# Patient Record
Sex: MALE | Race: WHITE | Employment: FULL TIME | URBAN - METROPOLITAN AREA
[De-identification: names, ages, dates, MRNs, and addresses within clinical notes are randomized per-mention and may not be internally consistent; named-entity substitution may affect disease eponyms.]

---

## 2017-01-05 PROBLEM — G56.02 CARPAL TUNNEL SYNDROME ON LEFT: Status: ACTIVE | Noted: 2017-01-05

## 2017-01-30 ENCOUNTER — OFFICE VISIT (OUTPATIENT)
Dept: FAMILY MEDICINE CLINIC | Facility: CLINIC | Age: 57
End: 2017-01-30

## 2017-01-30 VITALS
WEIGHT: 267 LBS | RESPIRATION RATE: 16 BRPM | HEART RATE: 66 BPM | BODY MASS INDEX: 37.8 KG/M2 | SYSTOLIC BLOOD PRESSURE: 110 MMHG | DIASTOLIC BLOOD PRESSURE: 80 MMHG | HEIGHT: 70.5 IN

## 2017-01-30 DIAGNOSIS — L98.9 BENIGN SKIN LESION OF NECK: Primary | ICD-10-CM

## 2017-01-30 DIAGNOSIS — M25.512 CHRONIC LEFT SHOULDER PAIN: ICD-10-CM

## 2017-01-30 DIAGNOSIS — G89.29 CHRONIC LEFT SHOULDER PAIN: ICD-10-CM

## 2017-01-30 PROCEDURE — 99213 OFFICE O/P EST LOW 20 MIN: CPT | Performed by: FAMILY MEDICINE

## 2017-01-30 PROCEDURE — 11420 EXC H-F-NK-SP B9+MARG 0.5/<: CPT | Performed by: FAMILY MEDICINE

## 2017-01-30 PROCEDURE — 88305 TISSUE EXAM BY PATHOLOGIST: CPT | Performed by: FAMILY MEDICINE

## 2017-01-30 NOTE — PROGRESS NOTES
Chief Complaint:   Patient presents with:  Growth    HPI:   This is a 61year old male presenting for right neck growth. Patient has right posterior skin lesion 1 cm puncture in size no surrounding erythema or redness. Patient has no fluctuance.       Laretta Runner blood in stool and abdominal distention. Endocrine: Negative for cold intolerance, heat intolerance, polydipsia, polyphagia and polyuria. Genitourinary: Negative for dysuria, hematuria, flank pain and difficulty urinating.    Musculoskeletal: Negative f respiratory distress. He has no wheezes. Abdominal: Soft. Bowel sounds are normal. He exhibits no distension. There is no tenderness. There is no rebound and no guarding. Musculoskeletal: He exhibits no effusion.       Left shoulder: He exhibits decreas

## 2017-02-15 ENCOUNTER — MED REC SCAN ONLY (OUTPATIENT)
Dept: FAMILY MEDICINE CLINIC | Facility: CLINIC | Age: 57
End: 2017-02-15

## 2017-02-20 PROBLEM — M12.812 ROTATOR CUFF TEAR ARTHROPATHY, LEFT: Status: ACTIVE | Noted: 2017-02-20

## 2017-02-20 PROBLEM — M75.102 ROTATOR CUFF TEAR ARTHROPATHY, LEFT: Status: ACTIVE | Noted: 2017-02-20

## 2017-03-01 ENCOUNTER — PATIENT MESSAGE (OUTPATIENT)
Dept: FAMILY MEDICINE CLINIC | Facility: CLINIC | Age: 57
End: 2017-03-01

## 2017-04-20 RX ORDER — NAPROXEN 500 MG/1
500 TABLET ORAL 2 TIMES DAILY
Qty: 60 TABLET | Refills: 3 | Status: SHIPPED | OUTPATIENT
Start: 2017-04-20 | End: 2018-01-08

## 2017-04-20 NOTE — TELEPHONE ENCOUNTER
From: Gregory Mello  To:  Anitha Stern MD  Sent: 4/20/2017 12:27 PM CDT  Subject: Medication Renewal Request    Original authorizing provider: MD Gregory Carrero would like a refill of the following medications:  naproxen 500 MG Ora

## 2017-11-07 ENCOUNTER — TELEPHONE (OUTPATIENT)
Dept: FAMILY MEDICINE CLINIC | Facility: CLINIC | Age: 57
End: 2017-11-07

## 2017-11-13 ENCOUNTER — OFFICE VISIT (OUTPATIENT)
Dept: FAMILY MEDICINE CLINIC | Facility: CLINIC | Age: 57
End: 2017-11-13

## 2017-11-13 VITALS
DIASTOLIC BLOOD PRESSURE: 72 MMHG | RESPIRATION RATE: 16 BRPM | BODY MASS INDEX: 37.37 KG/M2 | WEIGHT: 261 LBS | HEIGHT: 70 IN | TEMPERATURE: 98 F | SYSTOLIC BLOOD PRESSURE: 122 MMHG | HEART RATE: 64 BPM

## 2017-11-13 DIAGNOSIS — G47.33 OSA ON CPAP: ICD-10-CM

## 2017-11-13 DIAGNOSIS — M12.812 ROTATOR CUFF TEAR ARTHROPATHY, LEFT: ICD-10-CM

## 2017-11-13 DIAGNOSIS — D69.6 THROMBOCYTOPENIA, UNSPECIFIED (HCC): ICD-10-CM

## 2017-11-13 DIAGNOSIS — M75.102 ROTATOR CUFF TEAR ARTHROPATHY, LEFT: ICD-10-CM

## 2017-11-13 DIAGNOSIS — Z01.818 PRE-OP EVALUATION: Primary | ICD-10-CM

## 2017-11-13 DIAGNOSIS — Z99.89 OSA ON CPAP: ICD-10-CM

## 2017-11-13 PROCEDURE — 93000 ELECTROCARDIOGRAM COMPLETE: CPT | Performed by: FAMILY MEDICINE

## 2017-11-13 PROCEDURE — 99243 OFF/OP CNSLTJ NEW/EST LOW 30: CPT | Performed by: FAMILY MEDICINE

## 2017-11-14 NOTE — PROGRESS NOTES
Sawyer Charles is a 61year old male who presents for a pre-operative physical exam.   HPI related to surgery:   Sawyer Charles is presenting for surgery per request of Dr. Richa Valdes scheduled for a left shoulder replacement procedure to be performed b tablet Rfl: 3   Multiple Vitamins-Minerals (MULTIVITAMIN ADULT OR) Take by mouth. Disp:  Rfl:    Cholecalciferol (VITAMIN D3) 42449 units Oral Cap Take by mouth daily.  Disp:  Rfl:         REVIEW OF SYSTEMS:   GENERAL HEALTH:  Good energy level, good appeti Garima Yi has no significant history of cardiac or pulmonary conditions. He is a good surgical candidate and medically cleared for procedure. 1. Pre-op evaluation  IMPRESSION:  1.  Advanced/severe glenohumeral joint degenerative changes, with chronic r

## 2017-11-21 ENCOUNTER — LABORATORY ENCOUNTER (OUTPATIENT)
Dept: LAB | Facility: HOSPITAL | Age: 57
End: 2017-11-21
Attending: ORTHOPAEDIC SURGERY
Payer: COMMERCIAL

## 2017-11-21 DIAGNOSIS — Z01.818 PREOP EXAMINATION: ICD-10-CM

## 2017-11-21 PROCEDURE — 86901 BLOOD TYPING SEROLOGIC RH(D): CPT

## 2017-11-21 PROCEDURE — 86850 RBC ANTIBODY SCREEN: CPT

## 2017-11-21 PROCEDURE — 81003 URINALYSIS AUTO W/O SCOPE: CPT

## 2017-11-21 PROCEDURE — 85730 THROMBOPLASTIN TIME PARTIAL: CPT

## 2017-11-21 PROCEDURE — 85610 PROTHROMBIN TIME: CPT

## 2017-11-21 PROCEDURE — 87081 CULTURE SCREEN ONLY: CPT

## 2017-11-21 PROCEDURE — 86900 BLOOD TYPING SEROLOGIC ABO: CPT

## 2017-11-21 PROCEDURE — 80053 COMPREHEN METABOLIC PANEL: CPT

## 2017-11-21 PROCEDURE — 85025 COMPLETE CBC W/AUTO DIFF WBC: CPT

## 2017-11-21 PROCEDURE — 36415 COLL VENOUS BLD VENIPUNCTURE: CPT

## 2017-11-29 ENCOUNTER — ANESTHESIA EVENT (OUTPATIENT)
Dept: SURGERY | Facility: HOSPITAL | Age: 57
DRG: 483 | End: 2017-11-29
Payer: COMMERCIAL

## 2017-11-30 ENCOUNTER — APPOINTMENT (OUTPATIENT)
Dept: GENERAL RADIOLOGY | Facility: HOSPITAL | Age: 57
DRG: 483 | End: 2017-11-30
Attending: ORTHOPAEDIC SURGERY
Payer: COMMERCIAL

## 2017-11-30 ENCOUNTER — SURGERY (OUTPATIENT)
Age: 57
End: 2017-11-30

## 2017-11-30 ENCOUNTER — HOSPITAL ENCOUNTER (INPATIENT)
Facility: HOSPITAL | Age: 57
LOS: 1 days | Discharge: HOME HEALTH | DRG: 483 | End: 2017-12-01
Attending: ORTHOPAEDIC SURGERY | Admitting: ORTHOPAEDIC SURGERY
Payer: COMMERCIAL

## 2017-11-30 ENCOUNTER — ANESTHESIA (OUTPATIENT)
Dept: SURGERY | Facility: HOSPITAL | Age: 57
DRG: 483 | End: 2017-11-30
Payer: COMMERCIAL

## 2017-11-30 DIAGNOSIS — Z01.818 PREOP EXAMINATION: Primary | ICD-10-CM

## 2017-11-30 PROBLEM — M19.012 GLENOHUMERAL ARTHRITIS, LEFT: Status: ACTIVE | Noted: 2017-11-30

## 2017-11-30 PROCEDURE — 0RRK0JZ REPLACEMENT OF LEFT SHOULDER JOINT WITH SYNTHETIC SUBSTITUTE, OPEN APPROACH: ICD-10-PCS | Performed by: ORTHOPAEDIC SURGERY

## 2017-11-30 PROCEDURE — 3E0T3BZ INTRODUCTION OF ANESTHETIC AGENT INTO PERIPHERAL NERVES AND PLEXI, PERCUTANEOUS APPROACH: ICD-10-PCS | Performed by: ANESTHESIOLOGY

## 2017-11-30 PROCEDURE — 5A09357 ASSISTANCE WITH RESPIRATORY VENTILATION, LESS THAN 24 CONSECUTIVE HOURS, CONTINUOUS POSITIVE AIRWAY PRESSURE: ICD-10-PCS | Performed by: ORTHOPAEDIC SURGERY

## 2017-11-30 PROCEDURE — 73020 X-RAY EXAM OF SHOULDER: CPT | Performed by: ORTHOPAEDIC SURGERY

## 2017-11-30 PROCEDURE — 99222 1ST HOSP IP/OBS MODERATE 55: CPT | Performed by: INTERNAL MEDICINE

## 2017-11-30 PROCEDURE — 0LS40ZZ REPOSITION LEFT UPPER ARM TENDON, OPEN APPROACH: ICD-10-PCS | Performed by: ORTHOPAEDIC SURGERY

## 2017-11-30 DEVICE — GLOBAL UNITE STANDARD HUMERAL HEAD SIZE 48MM X 18MM
Type: IMPLANTABLE DEVICE | Site: SHOULDER | Status: FUNCTIONAL
Brand: GLOBAL UNITE

## 2017-11-30 DEVICE — GLOBAL UNITE POROCOAT STANDARD STEM SIZE 12 120MM
Type: IMPLANTABLE DEVICE | Site: SHOULDER | Status: FUNCTIONAL
Brand: GLOBAL UNITE

## 2017-11-30 DEVICE — GLOBAL ANCHOR PEG GLENOID PREMIERON X-LINKED PE SIZE 48MM
Type: IMPLANTABLE DEVICE | Site: SHOULDER | Status: FUNCTIONAL
Brand: GLOBAL

## 2017-11-30 DEVICE — ATTUNE PINNING SYSTEM
Type: IMPLANTABLE DEVICE | Status: FUNCTIONAL
Brand: ATTUNE

## 2017-11-30 DEVICE — GLOBAL UNITE ANATOMIC PROXIMAL BODY 135 DEGREE SIZE 12 POROCOAT
Type: IMPLANTABLE DEVICE | Site: SHOULDER | Status: FUNCTIONAL
Brand: GLOBAL UNITE

## 2017-11-30 DEVICE — DELTA XTEND METAGLENE CENTRAL GUIDE PIN DIAMETER 2,5 X 190 MM
Type: IMPLANTABLE DEVICE | Site: SHOULDER | Status: FUNCTIONAL
Brand: DELTA XTEND

## 2017-11-30 RX ORDER — CYCLOBENZAPRINE HCL 5 MG
5 TABLET ORAL 3 TIMES DAILY PRN
Status: DISCONTINUED | OUTPATIENT
Start: 2017-11-30 | End: 2017-12-01

## 2017-11-30 RX ORDER — SCOLOPAMINE TRANSDERMAL SYSTEM 1 MG/1
1 PATCH, EXTENDED RELEASE TRANSDERMAL ONCE
Status: DISCONTINUED | OUTPATIENT
Start: 2017-11-30 | End: 2017-12-01

## 2017-11-30 RX ORDER — HYDROMORPHONE HYDROCHLORIDE 1 MG/ML
0.4 INJECTION, SOLUTION INTRAMUSCULAR; INTRAVENOUS; SUBCUTANEOUS EVERY 2 HOUR PRN
Status: DISCONTINUED | OUTPATIENT
Start: 2017-11-30 | End: 2017-12-01

## 2017-11-30 RX ORDER — HYDROCODONE BITARTRATE AND ACETAMINOPHEN 5; 325 MG/1; MG/1
2 TABLET ORAL AS NEEDED
Status: DISCONTINUED | OUTPATIENT
Start: 2017-11-30 | End: 2017-11-30 | Stop reason: HOSPADM

## 2017-11-30 RX ORDER — ACETAMINOPHEN 325 MG/1
650 TABLET ORAL ONCE
Status: COMPLETED | OUTPATIENT
Start: 2017-11-30 | End: 2017-11-30

## 2017-11-30 RX ORDER — SODIUM CHLORIDE, SODIUM LACTATE, POTASSIUM CHLORIDE, CALCIUM CHLORIDE 600; 310; 30; 20 MG/100ML; MG/100ML; MG/100ML; MG/100ML
INJECTION, SOLUTION INTRAVENOUS CONTINUOUS
Status: DISCONTINUED | OUTPATIENT
Start: 2017-11-30 | End: 2017-11-30 | Stop reason: HOSPADM

## 2017-11-30 RX ORDER — ACETAMINOPHEN 325 MG/1
650 TABLET ORAL 4 TIMES DAILY
Status: DISCONTINUED | OUTPATIENT
Start: 2017-11-30 | End: 2017-12-01

## 2017-11-30 RX ORDER — POLYETHYLENE GLYCOL 3350 17 G/17G
17 POWDER, FOR SOLUTION ORAL DAILY PRN
Status: DISCONTINUED | OUTPATIENT
Start: 2017-11-30 | End: 2017-12-01

## 2017-11-30 RX ORDER — HYDROMORPHONE HYDROCHLORIDE 1 MG/ML
0.8 INJECTION, SOLUTION INTRAMUSCULAR; INTRAVENOUS; SUBCUTANEOUS EVERY 2 HOUR PRN
Status: DISCONTINUED | OUTPATIENT
Start: 2017-11-30 | End: 2017-12-01

## 2017-11-30 RX ORDER — NALOXONE HYDROCHLORIDE 0.4 MG/ML
80 INJECTION, SOLUTION INTRAMUSCULAR; INTRAVENOUS; SUBCUTANEOUS AS NEEDED
Status: DISCONTINUED | OUTPATIENT
Start: 2017-11-30 | End: 2017-11-30 | Stop reason: HOSPADM

## 2017-11-30 RX ORDER — ACETAMINOPHEN 325 MG/1
TABLET ORAL
Status: COMPLETED
Start: 2017-11-30 | End: 2017-11-30

## 2017-11-30 RX ORDER — BACITRACIN 50000 [USP'U]/1
INJECTION, POWDER, LYOPHILIZED, FOR SOLUTION INTRAMUSCULAR AS NEEDED
Status: DISCONTINUED | OUTPATIENT
Start: 2017-11-30 | End: 2017-11-30 | Stop reason: HOSPADM

## 2017-11-30 RX ORDER — SODIUM PHOSPHATE, DIBASIC AND SODIUM PHOSPHATE, MONOBASIC 7; 19 G/133ML; G/133ML
1 ENEMA RECTAL ONCE AS NEEDED
Status: DISCONTINUED | OUTPATIENT
Start: 2017-11-30 | End: 2017-12-01

## 2017-11-30 RX ORDER — OXYCODONE HCL 10 MG/1
10 TABLET, FILM COATED, EXTENDED RELEASE ORAL
Status: DISCONTINUED | OUTPATIENT
Start: 2017-11-30 | End: 2017-12-01

## 2017-11-30 RX ORDER — METOCLOPRAMIDE HYDROCHLORIDE 5 MG/ML
10 INJECTION INTRAMUSCULAR; INTRAVENOUS EVERY 6 HOURS PRN
Status: DISCONTINUED | OUTPATIENT
Start: 2017-11-30 | End: 2017-12-01

## 2017-11-30 RX ORDER — ONDANSETRON 2 MG/ML
4 INJECTION INTRAMUSCULAR; INTRAVENOUS EVERY 4 HOURS PRN
Status: DISCONTINUED | OUTPATIENT
Start: 2017-11-30 | End: 2017-12-01

## 2017-11-30 RX ORDER — BISACODYL 10 MG
10 SUPPOSITORY, RECTAL RECTAL
Status: DISCONTINUED | OUTPATIENT
Start: 2017-11-30 | End: 2017-12-01

## 2017-11-30 RX ORDER — OXYCODONE HCL 10 MG/1
10 TABLET, FILM COATED, EXTENDED RELEASE ORAL
Status: COMPLETED | OUTPATIENT
Start: 2017-11-30 | End: 2017-11-30

## 2017-11-30 RX ORDER — OXYCODONE HYDROCHLORIDE 5 MG/1
5 TABLET ORAL EVERY 4 HOURS PRN
Status: DISCONTINUED | OUTPATIENT
Start: 2017-11-30 | End: 2017-12-01

## 2017-11-30 RX ORDER — HYDROCODONE BITARTRATE AND ACETAMINOPHEN 5; 325 MG/1; MG/1
1 TABLET ORAL AS NEEDED
Status: DISCONTINUED | OUTPATIENT
Start: 2017-11-30 | End: 2017-11-30 | Stop reason: HOSPADM

## 2017-11-30 RX ORDER — MEPERIDINE HYDROCHLORIDE 25 MG/ML
12.5 INJECTION INTRAMUSCULAR; INTRAVENOUS; SUBCUTANEOUS AS NEEDED
Status: DISCONTINUED | OUTPATIENT
Start: 2017-11-30 | End: 2017-11-30 | Stop reason: HOSPADM

## 2017-11-30 RX ORDER — MIDAZOLAM HYDROCHLORIDE 1 MG/ML
1 INJECTION INTRAMUSCULAR; INTRAVENOUS EVERY 5 MIN PRN
Status: DISCONTINUED | OUTPATIENT
Start: 2017-11-30 | End: 2017-11-30 | Stop reason: HOSPADM

## 2017-11-30 RX ORDER — DOCUSATE SODIUM 100 MG/1
100 CAPSULE, LIQUID FILLED ORAL 2 TIMES DAILY
Status: DISCONTINUED | OUTPATIENT
Start: 2017-11-30 | End: 2017-12-01

## 2017-11-30 RX ORDER — SODIUM CHLORIDE, SODIUM LACTATE, POTASSIUM CHLORIDE, CALCIUM CHLORIDE 600; 310; 30; 20 MG/100ML; MG/100ML; MG/100ML; MG/100ML
INJECTION, SOLUTION INTRAVENOUS CONTINUOUS
Status: DISCONTINUED | OUTPATIENT
Start: 2017-11-30 | End: 2017-12-01

## 2017-11-30 RX ORDER — TRAMADOL HYDROCHLORIDE 50 MG/1
50 TABLET ORAL EVERY 6 HOURS
Status: DISCONTINUED | OUTPATIENT
Start: 2017-11-30 | End: 2017-12-01

## 2017-11-30 RX ORDER — KETOROLAC TROMETHAMINE 30 MG/ML
30 INJECTION, SOLUTION INTRAMUSCULAR; INTRAVENOUS EVERY 6 HOURS
Status: COMPLETED | OUTPATIENT
Start: 2017-11-30 | End: 2017-12-01

## 2017-11-30 RX ORDER — ENOXAPARIN SODIUM 100 MG/ML
40 INJECTION SUBCUTANEOUS DAILY
Status: DISCONTINUED | OUTPATIENT
Start: 2017-12-01 | End: 2017-12-01

## 2017-11-30 RX ORDER — OXYCODONE HYDROCHLORIDE 10 MG/1
10 TABLET ORAL EVERY 4 HOURS PRN
Status: DISCONTINUED | OUTPATIENT
Start: 2017-11-30 | End: 2017-12-01

## 2017-11-30 RX ORDER — METOCLOPRAMIDE HYDROCHLORIDE 5 MG/ML
10 INJECTION INTRAMUSCULAR; INTRAVENOUS AS NEEDED
Status: DISCONTINUED | OUTPATIENT
Start: 2017-11-30 | End: 2017-11-30 | Stop reason: HOSPADM

## 2017-11-30 RX ORDER — DIPHENHYDRAMINE HYDROCHLORIDE 50 MG/ML
12.5 INJECTION INTRAMUSCULAR; INTRAVENOUS AS NEEDED
Status: DISCONTINUED | OUTPATIENT
Start: 2017-11-30 | End: 2017-11-30 | Stop reason: HOSPADM

## 2017-11-30 RX ORDER — HYDROMORPHONE HYDROCHLORIDE 1 MG/ML
0.4 INJECTION, SOLUTION INTRAMUSCULAR; INTRAVENOUS; SUBCUTANEOUS EVERY 5 MIN PRN
Status: DISCONTINUED | OUTPATIENT
Start: 2017-11-30 | End: 2017-11-30 | Stop reason: HOSPADM

## 2017-11-30 RX ORDER — OXYCODONE HYDROCHLORIDE 15 MG/1
15 TABLET ORAL EVERY 4 HOURS PRN
Status: DISCONTINUED | OUTPATIENT
Start: 2017-11-30 | End: 2017-12-01

## 2017-11-30 RX ORDER — CEFAZOLIN SODIUM 1 G/3ML
INJECTION, POWDER, FOR SOLUTION INTRAMUSCULAR; INTRAVENOUS
Status: DISCONTINUED | OUTPATIENT
Start: 2017-11-30 | End: 2017-11-30 | Stop reason: HOSPADM

## 2017-11-30 RX ORDER — HYDROMORPHONE HYDROCHLORIDE 1 MG/ML
0.2 INJECTION, SOLUTION INTRAMUSCULAR; INTRAVENOUS; SUBCUTANEOUS EVERY 2 HOUR PRN
Status: DISCONTINUED | OUTPATIENT
Start: 2017-11-30 | End: 2017-12-01

## 2017-11-30 RX ORDER — ONDANSETRON 2 MG/ML
4 INJECTION INTRAMUSCULAR; INTRAVENOUS AS NEEDED
Status: DISCONTINUED | OUTPATIENT
Start: 2017-11-30 | End: 2017-11-30 | Stop reason: HOSPADM

## 2017-11-30 RX ORDER — DIPHENHYDRAMINE HYDROCHLORIDE 50 MG/ML
25 INJECTION INTRAMUSCULAR; INTRAVENOUS ONCE AS NEEDED
Status: ACTIVE | OUTPATIENT
Start: 2017-11-30 | End: 2017-11-30

## 2017-11-30 NOTE — ANESTHESIA PREPROCEDURE EVALUATION
PRE-OP EVALUATION    Patient Name: Cong Sandhu    Pre-op Diagnosis: LEFT SHOULDER OSTEOARTHRITIS    Procedure(s):  LEFT TOTAL SHOULDER REPLACEMENT    Surgeon(s) and Role:     Ernestina Jacobson MD - Primary    Pre-op vitals reviewed.         Body mas replacement5/2008     Smoking status: Never Smoker    Smokeless tobacco: Never Used    Alcohol use No    Comment: Quit       Drug use: No     Available pre-op labs reviewed.     Lab Results  Component Value Date   WBC 5.8 11/21/2017   RBC 4.80 11/21/2017

## 2017-11-30 NOTE — H&P
David Feliciano   11/13/2017 3:00 PM   Office Visit   MRN:  [id number]   Description: 61year old male Provider: Gilda Phipps MD Department: [facility] [place]   Scanning Cover Sheet     Click to print Amaya Higginbotham 852 for scanning   Office V • Obstructive sleep apnea (adult) (pediatric) Edwrad HST 8-1-16     AHI 30.9 [address] 73%      Past Surgical History:  8/5/16: CARPAL TUNNEL RELEASE Right      Comment: Dr. Nelson April 12/20/16: CARPAL TUNNEL RELEASE Left      Comment: Dr. Nelson April 01/09/2012: Respiratory: Clear to auscultation bilaterally. No wheezes. No rhonchi. Cardiovascular: S1, S2.  Regular rate and rhythm. No murmurs. Equal pulses   Abdomen: Soft, nontender, nondistended. Positive bowel sounds.  No rebound tenderness  Neurologic: No fo

## 2017-11-30 NOTE — BRIEF OP NOTE
Pre-Operative Diagnosis: LEFT SHOULDER OSTEOARTHRITIS     Post-Operative Diagnosis: LEFT SHOULDER OSTEOARTHRITIS     Procedure Performed:   Procedure(s):  LEFT TOTAL SHOULDER REPLACEMENT    Surgeon(s) and Role:     Arjun Valdez MD - Primary    Ass

## 2017-11-30 NOTE — ANESTHESIA POSTPROCEDURE EVALUATION
Cyril White Patient Status:  Surgery Admit   Age/Gender 61year old male MRN [id number]   [facility] SURGERY Attending Orion Vazquez MD   Hosp Day # 0 PCP Tiffanie Live MD       Anesthesia Post-op Note    Procedure(

## 2017-11-30 NOTE — CONSULTS
[facility]  Jeronimo Oglesby Patient Status:  Inpatient    1960 MRN [id number]   [facility] 3SW-A Attending Jeannie Lopez MD   Hosp Day # 0 PCP Dolores Dacosta MD     Reason for consult: med mgmt    Requested by was completed. Pertinent positives and negatives noted in the HPI.     Physical Exam:    /72 (BP Location: Right arm)   Pulse 67   Temp 97.8 °F (36.6 °C) (Oral)   Resp 10   Ht 6' (1.829 m)   Wt 252 lb 15.7 oz (114.8 kg)   SpO2 96%   BMI 34.31 kg/m²

## 2017-12-01 VITALS
WEIGHT: 253 LBS | TEMPERATURE: 98 F | DIASTOLIC BLOOD PRESSURE: 67 MMHG | SYSTOLIC BLOOD PRESSURE: 116 MMHG | OXYGEN SATURATION: 95 % | HEIGHT: 72 IN | RESPIRATION RATE: 16 BRPM | BODY MASS INDEX: 34.27 KG/M2 | HEART RATE: 72 BPM

## 2017-12-01 PROCEDURE — 99231 SBSQ HOSP IP/OBS SF/LOW 25: CPT | Performed by: INTERNAL MEDICINE

## 2017-12-01 RX ORDER — HYDROCODONE BITARTRATE AND ACETAMINOPHEN 10; 325 MG/1; MG/1
2 TABLET ORAL EVERY 4 HOURS PRN
Status: DISCONTINUED | OUTPATIENT
Start: 2017-12-01 | End: 2017-12-01

## 2017-12-01 RX ORDER — ACETAMINOPHEN 325 MG/1
650 TABLET ORAL EVERY 6 HOURS PRN
Status: DISCONTINUED | OUTPATIENT
Start: 2017-12-01 | End: 2017-12-01

## 2017-12-01 RX ORDER — HYDROCODONE BITARTRATE AND ACETAMINOPHEN 10; 325 MG/1; MG/1
1-2 TABLET ORAL EVERY 6 HOURS PRN
Qty: 40 TABLET | Refills: 0 | Status: SHIPPED | OUTPATIENT
Start: 2017-12-01 | End: 2017-12-07

## 2017-12-01 RX ORDER — HYDROCODONE BITARTRATE AND ACETAMINOPHEN 10; 325 MG/1; MG/1
1 TABLET ORAL EVERY 4 HOURS PRN
Status: DISCONTINUED | OUTPATIENT
Start: 2017-12-01 | End: 2017-12-01

## 2017-12-01 RX ORDER — TRAMADOL HYDROCHLORIDE 50 MG/1
50 TABLET ORAL EVERY 6 HOURS PRN
Status: DISCONTINUED | OUTPATIENT
Start: 2017-12-01 | End: 2017-12-01

## 2017-12-01 NOTE — PAYOR COMM NOTE
--------------  ADMISSION REVIEW     Payor: [address] [place] [address] Drive #:  [id number]  Authorization Number: [id number]    Admit date: 11/30/17  Admit time: 8684       Admitting Physician: Judd Mcmahon MD  Attending Physician:  MD ABDIAZIZ Decker Please see above for details and additional information. Patient reports previous anesthesia:  Yes.   Previous complications: No     Comorbidities:   Pre-op evaluation  (primary encounter diagnosis)  NANDINI on CPAP  -stable, doing well with CPAP  Thrombocytop PSYCHE: no hyper- or hypoactivity, good energy level. HEMATOLOGY: no bruising or bleeding  ENDOCRINE: no excessive thirst or urination; ALLERGY/IMM. : no food or seasonal allergies      PHYSICAL EXAM:   /72   Pulse 64   Temp 98 °F (36.7 °C) (Oral) - CBC WITH DIFFERENTIAL WITH PLATELET; Future  - COMP METABOLIC PANEL (14); Future  - URINALYSIS WITH CULTURE REFLEX; Future  - PROTHROMBIN TIME (PT); Future  - PTT, ACTIVATED;  Future  - MSSA AND MRSA CULTURE SCREEN; Future  - ELECTROCARDIOGRAM, COMPLETE 11/30/2017 1900 New Bag (none) Intravenous Starr Casillas RN    11/30/2017 1236 New Bag (none) Intravenous [place], [facility]      magnesium hydroxide (MILK OF MAGNESIA) 400 MG/5ML suspension 30 mL     Date Action Dose Route User    12/1/2017 05

## 2017-12-01 NOTE — PROGRESS NOTES
[facility]  Progress Note     Ashley Barber Patient Status:  Inpatient    1960 MRN [id number]   [facility] 3SW-A Attending Lucero Kennedy MD   Hosp Day # 1 PCP Katherin Hernandez MD     Chief Complaint: med mgmt    S: Patient discussed with pt and nurse    Vilma Echevarria MD

## 2017-12-01 NOTE — PLAN OF CARE
Microfoam dressing removed. Aquacel dressing intact and dry with scant old drainage. Slight bruising to the upper arm surrounding dressing. Ice therapy in place.

## 2017-12-01 NOTE — PROGRESS NOTES
Post Op Day 1 Ortho Note    Status Post Nerve Block:  Type of Nerve Block: Left Interscalene  Single Injection Nerve Block    Post op review: No evidence of immediate block related complications and No paresthesia noted    Assessed pt in chair.  Pt rates pa

## 2017-12-01 NOTE — RESPIRATORY THERAPY NOTE
NANDINI Equipment Usage Summary :            Set Mode :AUTO CPAP W FLEX          Usage in Hours:9;53          90% Pressure (EPAP) : 9.1           90% Insp Pressure (IPAP);           AHI : 8.1          Supplemental Oxygen :      LPM

## 2017-12-01 NOTE — PROGRESS NOTES
Cyril 178 Patient Status:  Inpatient    1960 MRN [id number]   [facility] 3SW-A Attending Pari Stern MD   Hosp Day # 1 PCP MD Jordan Garzapeyton Ricci is a 61year old male patient.     Hemalatha prescriptions on file. No Known Allergies    Active Problems:    Glenohumeral arthritis, left      Blood pressure 112/60, pulse 74, temperature 97.7 °F (36.5 °C), temperature source Oral, resp.  rate 18, height 6' (1.829 m), weight 252 lb 15.7 oz (114.8

## 2017-12-01 NOTE — OCCUPATIONAL THERAPY NOTE
OCCUPATIONAL THERAPY QUICK EVALUATION - INPATIENT    Room Number: 359/359-A  Evaluation Date: 12/1/2017     Type of Evaluation: Quick Eval  Presenting Problem: s/p L total shoulder arthoplasty- Dr. Renetta Martinez    Physician Order: Asha Brooke to Occupational [place] Upper Extremity: Non-Weight Bearing          PAIN ASSESSMENT  Ratin          COGNITION  WNL    RANGE OF MOTION AND STRENGTH ASSESSMENT  Upper extremity ROM is within functional limits except Left shoulder post surgery limitations.   Elbow and hand [facility] concerns addressed; Family present    ASSESSMENT     Patient is a 61year old male admitted on 11/30/2017 for elective L TSA. Complete medical history and occupational profile noted above. Functional outcome measures completed include:   The AM-THEODORA ' '6-Clic

## 2017-12-01 NOTE — PROGRESS NOTES
Patient received from PACU s/p LTSA. Dressing CDI. PAtient denies pain at this time. Able to move fingers, reports sensation to hand. Ice pack in place.    Huston catheter in place and patent, will remove in am.   Dr Jayson Aguilar notified of admission and consult

## 2017-12-01 NOTE — OPERATIVE REPORT
[facility]    PATIENT'S NAME: [facility] Room   ATTENDING PHYSICIAN: Karen Downs M.D. OPERATING PHYSICIAN: Karen Downs M.D.    PATIENT ACCOUNT#:   [de-identified]    LOCATION:  [address] [place] [address]  MEDICAL RECORD #:   [id number]       DATE OF BIRTH used plus the anterior labral retractors. Circumferential labral resection was performed. There was increased erosion posteriorly compared to the anterior aspect, and a 48 plus 5 template was positioned and the guide pin placed.   The reamer was passed ov

## 2017-12-01 NOTE — PHYSICAL THERAPY NOTE
PHYSICAL THERAPY EVALUATION - INPATIENT     Room Number: 359/359-A  Evaluation Date: 12/1/2017  Type of Evaluation: Initial  Physician Order: PT Eval and Treat    Presenting Problem: S/p L TSR on 11/30/17  Reason for Therapy: Mobility Dysfunction and D functional limits except L shoulder range - imposed limitation following surgery.     Lower extremity ROM is within functional limits     Lower extremity strength is within functional limits     BALANCE  Static Sitting: Good  Dynamic Sitting: Good  Static S well.    Exercise/Education Provided:  Functional activity tolerated  Gait training  Upper therapeutic exercise:  Per Dr. Ronny Igancio protocol  Transfer training    Patient End of Session: Up in chair;Needs met;Call light within reach; All patient questions and

## 2017-12-01 NOTE — CM/SW NOTE
12/01/17 1500   Discharge disposition   Discharged to: Home-Health   Name of Facillity/Home Care/Hospice ATI   Discharge transportation Private car   AVS sent via [facility] prior to discharge.

## 2017-12-04 ENCOUNTER — TELEPHONE (OUTPATIENT)
Dept: FAMILY MEDICINE CLINIC | Facility: CLINIC | Age: 57
End: 2017-12-04

## 2017-12-04 ENCOUNTER — PATIENT OUTREACH (OUTPATIENT)
Dept: CASE MANAGEMENT | Age: 57
End: 2017-12-04

## 2017-12-04 DIAGNOSIS — M19.012 GLENOHUMERAL ARTHRITIS, LEFT: ICD-10-CM

## 2017-12-04 NOTE — TELEPHONE ENCOUNTER
Patient discharge home from [facility] on 12/1/17 and recommended to have a TCM HFU by 12/8/17.  NCM attempted to schedule TCM HFU patient states he will call himself to schedule     Please notify Dr. Delroy Bermudez of the above and then call the patient to t

## 2017-12-04 NOTE — PROGRESS NOTES
Initial Post Discharge Follow Up   Discharge Date: 12/1/17  Contact Date: 12/4/2017    Consent Verification:  Assessment Completed With: Patient  HIPAA Verified?   Yes    Discharge Dx:     Left Glenhumeral arthritis, S/P Left total shoulder replacement medication? No  • Did you  your discharge medications when you left the hospital? Yes  • May I go over your medications with you to make sure we are not missing anything? yes  • Are there any reasons that keep you from taking your medication as presc office. Have you made all of your follow up appointments? no, patient does have a follow up with Dr. Santos Late, surgeon    Is there any reason as to why you cannot make your appointments?    No     NCM Reviewed upcoming Specialist Appt with patient

## 2017-12-07 ENCOUNTER — OFFICE VISIT (OUTPATIENT)
Dept: FAMILY MEDICINE CLINIC | Facility: CLINIC | Age: 57
End: 2017-12-07

## 2017-12-07 VITALS
SYSTOLIC BLOOD PRESSURE: 120 MMHG | WEIGHT: 253 LBS | HEART RATE: 88 BPM | RESPIRATION RATE: 16 BRPM | TEMPERATURE: 98 F | DIASTOLIC BLOOD PRESSURE: 80 MMHG | HEIGHT: 70 IN | OXYGEN SATURATION: 99 % | BODY MASS INDEX: 36.22 KG/M2

## 2017-12-07 DIAGNOSIS — M12.9 ARTHROPATHY: ICD-10-CM

## 2017-12-07 DIAGNOSIS — M19.012 GLENOHUMERAL ARTHRITIS, LEFT: ICD-10-CM

## 2017-12-07 DIAGNOSIS — Z96.612 S/P SHOULDER REPLACEMENT, LEFT: Primary | ICD-10-CM

## 2017-12-07 PROCEDURE — 99495 TRANSJ CARE MGMT MOD F2F 14D: CPT | Performed by: FAMILY MEDICINE

## 2017-12-08 NOTE — PROGRESS NOTES
HPI:    Mary Alicebonita Renteria is a 61year old male her today for hospital follow up. Discharge Summary was obtained and reviewed.     Patient is status post left total shoulder replacement healing appropriately denies any new complaints needs wound change 1-2 cups/cans daily    Exercise Yes    Comment: 4x a week    Seat Belt Yes     Social History Narrative   None on file        ROS:   Patient denies any shortness of breath, denies chest pain and denies any recent fevers or chills.   Patient reports no urina

## 2017-12-18 ENCOUNTER — OFFICE VISIT (OUTPATIENT)
Dept: PHYSICAL THERAPY | Age: 57
End: 2017-12-18
Attending: ORTHOPAEDIC SURGERY
Payer: COMMERCIAL

## 2017-12-18 DIAGNOSIS — M19.012 GLENOHUMERAL ARTHRITIS, LEFT: ICD-10-CM

## 2017-12-18 PROCEDURE — 97110 THERAPEUTIC EXERCISES: CPT

## 2017-12-18 PROCEDURE — 97162 PT EVAL MOD COMPLEX 30 MIN: CPT

## 2017-12-18 NOTE — PROGRESS NOTES
UPPER EXTREMITY EVALUATION:   Referring Physician: Dr. Daysi Carrillo  Diagnosis: L Glenohumeral arthritis, left (M19.012)  TSA 11/30/2017 Date of Service: 12/18/2017     PATIENT Nahun Hoskins is a 61year old y/o male who presents to therapy Cervical Screen:  NT  Palpation: tenderness around L anterior shoulder and incision area  Sensation: intact     ROM: WNL shoulder and elbow except below  Shoulder  Elbow WNLs   Flexion: R 160*; L 125*  Abduction: R 165*; L 85*  ER: R 90* ; L 20*  IR: R 7 lift light weights waist to chest height with no difficulty in 6 weeks   Patient will be independent with upgraded HEP in order to maintain progress achieved in PT. Other goals to be updated based on TSA protocol.      Frequency / Duration: Patient will b

## 2017-12-20 ENCOUNTER — OFFICE VISIT (OUTPATIENT)
Dept: PHYSICAL THERAPY | Age: 57
End: 2017-12-20
Attending: ORTHOPAEDIC SURGERY
Payer: COMMERCIAL

## 2017-12-20 PROCEDURE — 97110 THERAPEUTIC EXERCISES: CPT

## 2017-12-20 NOTE — PROGRESS NOTES
Dx: L Glenohumeral arthritis, left (M19.012)  TSA 11/30/2017        Authorized # of Visits: Acie Rinne       Next MD visit: none scheduled  Fall Risk: standard         Precautions: n/a             Subjective: Patient states that he felt great after last visit.  I Skilled Services: shoulder joint ROM to increase passive range of motion in preparation for AAROM to AROM and RROM per TSA protocol. Charges:  Thera Ex 2 (25 min)        Total Timed Treatment: 25 min  Total Treatment Time: 25 min

## 2017-12-28 ENCOUNTER — APPOINTMENT (OUTPATIENT)
Dept: PHYSICAL THERAPY | Age: 57
End: 2017-12-28
Attending: ORTHOPAEDIC SURGERY
Payer: COMMERCIAL

## 2018-01-03 ENCOUNTER — APPOINTMENT (OUTPATIENT)
Dept: PHYSICAL THERAPY | Age: 58
End: 2018-01-03
Attending: ORTHOPAEDIC SURGERY
Payer: COMMERCIAL

## 2018-01-03 ENCOUNTER — OFFICE VISIT (OUTPATIENT)
Dept: PHYSICAL THERAPY | Age: 58
End: 2018-01-03
Attending: ORTHOPAEDIC SURGERY
Payer: COMMERCIAL

## 2018-01-03 PROCEDURE — 97110 THERAPEUTIC EXERCISES: CPT

## 2018-01-03 NOTE — PROGRESS NOTES
Dx: L Glenohumeral arthritis, left (M19.012)  TSA 11/30/2017        Authorized # of Visits: Octavia Powell MD visit: none scheduled  Fall Risk: standard         Precautions: n/a             Subjective: Patient states that he has been doing his HEP.  He sta (scaption)        ER in scapular plane to 50* ER in scapular plane        Reviewed AROM to elbow, wrist and finger   Scapular clock exercises Passive L biceps stretch 5 sec x 10                                                              Skilled Services:

## 2018-01-08 ENCOUNTER — APPOINTMENT (OUTPATIENT)
Dept: PHYSICAL THERAPY | Age: 58
End: 2018-01-08
Attending: ORTHOPAEDIC SURGERY
Payer: COMMERCIAL

## 2018-01-10 ENCOUNTER — APPOINTMENT (OUTPATIENT)
Dept: PHYSICAL THERAPY | Age: 58
End: 2018-01-10
Attending: ORTHOPAEDIC SURGERY
Payer: COMMERCIAL

## 2018-01-10 PROCEDURE — 97110 THERAPEUTIC EXERCISES: CPT

## 2018-01-15 ENCOUNTER — APPOINTMENT (OUTPATIENT)
Dept: PHYSICAL THERAPY | Age: 58
End: 2018-01-15
Attending: ORTHOPAEDIC SURGERY
Payer: COMMERCIAL

## 2018-01-15 ENCOUNTER — OFFICE VISIT (OUTPATIENT)
Dept: PHYSICAL THERAPY | Age: 58
End: 2018-01-15
Attending: ORTHOPAEDIC SURGERY
Payer: COMMERCIAL

## 2018-01-15 PROCEDURE — 97110 THERAPEUTIC EXERCISES: CPT

## 2018-01-15 NOTE — PROGRESS NOTES
Dx: L Glenohumeral arthritis, left (M19.012)  TSA 11/30/2017        Authorized # of Visits: Yariel Powell MD visit: 02/05/2018  Fall Risk: standard         Precautions: n/a             Subjective: Patient states that he has been having pain in the outer 01/10/2018  Tx#: 4/12 Date:   01/15/2018  Tx#: 5/12 Date: Tx#: 6/ Date: Tx#: 7/ Date:    Tx#: 8/   Manual Therapy  Over head pulley flexion x 20  scaption x 20 X 20      X 20      Supine PROM L shoulder flexion to 135*  Abduction (45* anterior)  to 12

## 2018-01-17 ENCOUNTER — APPOINTMENT (OUTPATIENT)
Dept: PHYSICAL THERAPY | Age: 58
End: 2018-01-17
Attending: ORTHOPAEDIC SURGERY
Payer: COMMERCIAL

## 2018-01-17 ENCOUNTER — OFFICE VISIT (OUTPATIENT)
Dept: PHYSICAL THERAPY | Age: 58
End: 2018-01-17
Attending: ORTHOPAEDIC SURGERY
Payer: COMMERCIAL

## 2018-01-17 PROCEDURE — 97110 THERAPEUTIC EXERCISES: CPT

## 2018-01-17 NOTE — PROGRESS NOTES
Dx: L Glenohumeral arthritis, left (M19.012)  TSA 11/30/2017        Authorized # of Visits: Ronal Powell MD visit: 02/05/2018  Fall Risk: standard         Precautions: n/a             Subjective: Patient states that L shoulder continues to feel better. shoulder flexion to 135*  Abduction (45* anterior)  to 120* Supine PROM L shoulder  Abduction (scaption) Supine PROM L shoulder abd (scaption) Supine PROM L shoulder abd (scaption) Shoulder PROM L shoulder abd (scaption)     ER in scapular plane to 50* ER

## 2018-01-22 ENCOUNTER — OFFICE VISIT (OUTPATIENT)
Dept: PHYSICAL THERAPY | Age: 58
End: 2018-01-22
Attending: ORTHOPAEDIC SURGERY
Payer: COMMERCIAL

## 2018-01-22 PROCEDURE — 97110 THERAPEUTIC EXERCISES: CPT

## 2018-01-22 NOTE — PROGRESS NOTES
Dx: L Glenohumeral arthritis, left (M19.012)  TSA 11/30/2017        Authorized # of Visits: Dick Valdez       Sherry MD visit: 02/05/2018  Fall Risk: standard         Precautions: n/a             Subjective: Patient states that he is sleeping better at night.  He st 20      X 20    Supine PROM L shoulder flexion to 135*  Abduction (45* anterior)  to 120* Supine PROM L shoulder  Abduction (scaption) Supine PROM L shoulder abd (scaption) Supine PROM L shoulder abd (scaption) Shoulder PROM L shoulder abd (scaption) Shoul

## 2018-01-24 ENCOUNTER — OFFICE VISIT (OUTPATIENT)
Dept: PHYSICAL THERAPY | Age: 58
End: 2018-01-24
Attending: ORTHOPAEDIC SURGERY
Payer: COMMERCIAL

## 2018-01-24 PROCEDURE — 97110 THERAPEUTIC EXERCISES: CPT

## 2018-01-24 NOTE — PROGRESS NOTES
Dx: L Glenohumeral arthritis, left (M19.012)  TSA 11/30/2017        Authorized # of Visits: Migdalia Powell MD visit: 02/12/2018  Fall Risk: standard         Precautions: n/a             Subjective: Patient states that L shoulder is feeling good.  He states Date:   01/24/2017  Tx#: 8/12   Manual Therapy  Over head pulley flexion x 20  scaption x 20 X 20      X 20 X 20      X 20 X 20      X 20 X 10      X 20   Supine PROM L shoulder flexion to 135*  Abduction (45* anterior)  to 120* Supine PROM L shoulder  Abd

## 2018-01-25 ENCOUNTER — PATIENT MESSAGE (OUTPATIENT)
Dept: FAMILY MEDICINE CLINIC | Facility: CLINIC | Age: 58
End: 2018-01-25

## 2018-01-26 NOTE — TELEPHONE ENCOUNTER
From: Cortney Barahona  To: Sydney Guevara MD  Sent: 1/25/2018 5:56 PM CST  Subject: Referral Request    please look back on my medical history. there is a wrong date of my carpel tunnel surgery. your records show 12 20 2016 . it is incorrect.   the c

## 2018-01-29 ENCOUNTER — OFFICE VISIT (OUTPATIENT)
Dept: PHYSICAL THERAPY | Age: 58
End: 2018-01-29
Attending: FAMILY MEDICINE
Payer: COMMERCIAL

## 2018-01-29 PROCEDURE — 97110 THERAPEUTIC EXERCISES: CPT

## 2018-01-29 NOTE — PROGRESS NOTES
Dx: L Glenohumeral arthritis, left (M19.012)  TSA 11/30/2017        Authorized # of Visits: Shae Menendez       Next MD visit: 02/12/2018  Fall Risk: standard         Precautions: n/a             Subjective: Patient states that his grandkids were over for the weeke 3/12 Date:   01/10/2018  Tx#: 4/12 Date:   01/15/2018  Tx#: 5/12 Date:   01/17/2018  Tx#: 6/12 Date:   01/22/2018  Tx#: 7/12 Date:   01/24/2017  Tx#: 8/12 Date:  01/29/2018  Tx: 9/12    Manual Therapy  Over head pulley flexion x 20  scaption x 20 X 20 shoulder joint ROM to increase passive range of motion in preparation for AAROM to AROM and RROM per TSA protocol, verbal cues to keep B arms at side during scapular retraction, B shoulder mini-extension and L shoulder ER at 0 deg. Charges:  Thera Ex 3 (

## 2018-01-31 ENCOUNTER — OFFICE VISIT (OUTPATIENT)
Dept: PHYSICAL THERAPY | Age: 58
End: 2018-01-31
Attending: FAMILY MEDICINE
Payer: COMMERCIAL

## 2018-01-31 PROCEDURE — 97110 THERAPEUTIC EXERCISES: CPT

## 2018-01-31 NOTE — PROGRESS NOTES
Dx: L Glenohumeral arthritis, left (M19.012)  TSA 11/30/2017        Authorized # of Visits: Carmen Maravilla       Next MD visit: 02/12/2018  Fall Risk: standard         Precautions: n/a             Subjective: Patient states he hardly experiences the L upper arm pain 01/22/2018  Tx#: 7/12 Date:   01/24/2017  Tx#: 8/12 Date:  01/29/2018  Tx: 9/12 Date:  01/31/2018  Tx: 10/12   Manual Therapy  Over head pulley flexion x 20  scaption x 20 X 20      X 20 X 20      X 20 X 20      X 20 X 10      X 20 scaption only x 20 X 2 x 10 2 x 10        TRX AAROM shoulder flex (walks) 5 sec x 10 5 sec x 10 2 x 10 2 x 10         Upper cycle 3 min forward, 3 min backward no resistance 3 min forward, 3 min backward 3 1/2 min F  3 1/2 min B           Supine 1 lb dumbbell L shoulder flex x 1

## 2018-02-05 ENCOUNTER — APPOINTMENT (OUTPATIENT)
Dept: PHYSICAL THERAPY | Age: 58
End: 2018-02-05
Payer: COMMERCIAL

## 2018-02-05 ENCOUNTER — TELEPHONE (OUTPATIENT)
Dept: PHYSICAL THERAPY | Age: 58
End: 2018-02-05

## 2018-02-07 ENCOUNTER — OFFICE VISIT (OUTPATIENT)
Dept: PHYSICAL THERAPY | Age: 58
End: 2018-02-07
Attending: FAMILY MEDICINE
Payer: COMMERCIAL

## 2018-02-07 PROCEDURE — 97110 THERAPEUTIC EXERCISES: CPT

## 2018-02-07 NOTE — PROGRESS NOTES
Dx: L Glenohumeral arthritis, left (M19.012)  TSA 11/30/2017        Authorized # of Visits: Lani Lakhani       Next MD visit: 02/26/2018  Fall Risk: standard         Precautions: n/a             Subjective: Patient states he was pushing snow with shovel yesterday shoulder flex 2 x 10      Shoulder PROM L shoulder abd (scaption) Shoulder PROM L shoulder abd (scaption) Shoulder PROM L shoulder abd (scaption)  PROM L shoulder abd (scaption) PROM L shoulder abd (scaption)      ER in scapular plane PROM PROM ER in scapu

## 2018-02-12 ENCOUNTER — APPOINTMENT (OUTPATIENT)
Dept: PHYSICAL THERAPY | Age: 58
End: 2018-02-12
Payer: COMMERCIAL

## 2018-02-12 ENCOUNTER — TELEPHONE (OUTPATIENT)
Dept: PHYSICAL THERAPY | Age: 58
End: 2018-02-12

## 2018-02-12 ENCOUNTER — TELEPHONE (OUTPATIENT)
Dept: FAMILY MEDICINE CLINIC | Facility: CLINIC | Age: 58
End: 2018-02-12

## 2018-02-12 DIAGNOSIS — M19.012 GLENOHUMERAL ARTHRITIS, LEFT: Primary | ICD-10-CM

## 2018-02-12 NOTE — TELEPHONE ENCOUNTER
Below message received from referral dept:    [facility] Clinical Staff Cc: Fe Aaron Referral Pool   Phone Number: [phone]             Patient Name:Ian Duong   : 58   Reason for the order/referral: Surgery f/u a

## 2018-02-14 ENCOUNTER — APPOINTMENT (OUTPATIENT)
Dept: PHYSICAL THERAPY | Age: 58
End: 2018-02-14
Payer: COMMERCIAL

## 2018-02-19 ENCOUNTER — OFFICE VISIT (OUTPATIENT)
Dept: PHYSICAL THERAPY | Age: 58
End: 2018-02-19
Attending: FAMILY MEDICINE
Payer: COMMERCIAL

## 2018-02-19 ENCOUNTER — TELEPHONE (OUTPATIENT)
Dept: FAMILY MEDICINE CLINIC | Facility: CLINIC | Age: 58
End: 2018-02-19

## 2018-02-19 PROCEDURE — 97110 THERAPEUTIC EXERCISES: CPT

## 2018-02-19 NOTE — TELEPHONE ENCOUNTER
Patient had a shoulder replacement and is needing a referral in the system for Dr. Alex Jones. He would like it back dated to his first visit so insurance pays it.

## 2018-02-21 ENCOUNTER — APPOINTMENT (OUTPATIENT)
Dept: PHYSICAL THERAPY | Age: 58
End: 2018-02-21
Payer: COMMERCIAL

## 2018-02-26 ENCOUNTER — OFFICE VISIT (OUTPATIENT)
Dept: PHYSICAL THERAPY | Age: 58
End: 2018-02-26
Attending: FAMILY MEDICINE
Payer: COMMERCIAL

## 2018-02-26 PROCEDURE — 97110 THERAPEUTIC EXERCISES: CPT

## 2018-02-26 NOTE — PROGRESS NOTES
Dx: L Glenohumeral arthritis, left (M19.012)  TSA 11/30/2017        Authorized # of Visits: Carmen Powell MD visit: 02/26/2018  Fall Risk: standard         Precautions: n/a             Subjective: Patient states that he has not had any pain in the L shou 10 2 x 10 Shoulder flex x 10  Scaption x 10    Shoulder PROM L shoulder abd (scaption) Shoulder PROM L shoulder abd (scaption) Shoulder PROM L shoulder abd (scaption)  PROM L shoulder abd (scaption) PROM L shoulder abd (scaption) Wall push up 2 x 10 2 x 15 scaption x 10 2 lbs     Upper cycle 3 min forward, 3 min backward no resistance 3 min forward, 3 min backward 3 1/2 min F  3 1/2 min B 3 min F, 3 min B  L=2 Hill profile L=3  3'F, 3'B Hill profile L=4   3'F, 3'B       Supine 1 lb dumbbell L shoulder flex x

## 2018-02-28 ENCOUNTER — APPOINTMENT (OUTPATIENT)
Dept: PHYSICAL THERAPY | Age: 58
End: 2018-02-28
Payer: COMMERCIAL

## 2018-03-12 ENCOUNTER — OFFICE VISIT (OUTPATIENT)
Dept: FAMILY MEDICINE CLINIC | Facility: CLINIC | Age: 58
End: 2018-03-12

## 2018-03-12 ENCOUNTER — LAB ENCOUNTER (OUTPATIENT)
Dept: LAB | Age: 58
End: 2018-03-12
Attending: FAMILY MEDICINE
Payer: COMMERCIAL

## 2018-03-12 VITALS
RESPIRATION RATE: 16 BRPM | WEIGHT: 256 LBS | HEIGHT: 70 IN | DIASTOLIC BLOOD PRESSURE: 82 MMHG | HEART RATE: 86 BPM | SYSTOLIC BLOOD PRESSURE: 122 MMHG | BODY MASS INDEX: 36.65 KG/M2 | TEMPERATURE: 98 F

## 2018-03-12 DIAGNOSIS — Z13.228 SCREENING FOR ENDOCRINE, NUTRITIONAL, METABOLIC AND IMMUNITY DISORDER: ICD-10-CM

## 2018-03-12 DIAGNOSIS — Z13.0 SCREENING FOR ENDOCRINE, NUTRITIONAL, METABOLIC AND IMMUNITY DISORDER: ICD-10-CM

## 2018-03-12 DIAGNOSIS — E55.9 VITAMIN D DEFICIENCY: ICD-10-CM

## 2018-03-12 DIAGNOSIS — Z13.21 SCREENING FOR ENDOCRINE, NUTRITIONAL, METABOLIC AND IMMUNITY DISORDER: ICD-10-CM

## 2018-03-12 DIAGNOSIS — Z00.00 ANNUAL PHYSICAL EXAM: Primary | ICD-10-CM

## 2018-03-12 DIAGNOSIS — Z13.29 SCREENING FOR ENDOCRINE, NUTRITIONAL, METABOLIC AND IMMUNITY DISORDER: ICD-10-CM

## 2018-03-12 DIAGNOSIS — D69.6 THROMBOCYTOPENIA, UNSPECIFIED (HCC): ICD-10-CM

## 2018-03-12 LAB
25-HYDROXYVITAMIN D (TOTAL): 22.8 NG/ML (ref 30–100)
ALBUMIN SERPL-MCNC: 3.8 G/DL (ref 3.5–4.8)
ALP LIVER SERPL-CCNC: 58 U/L (ref 45–117)
ALT SERPL-CCNC: 17 U/L (ref 17–63)
AST SERPL-CCNC: 13 U/L (ref 15–41)
BASOPHILS # BLD AUTO: 0.04 X10(3) UL (ref 0–0.1)
BASOPHILS NFR BLD AUTO: 0.8 %
BILIRUB SERPL-MCNC: 0.9 MG/DL (ref 0.1–2)
BUN BLD-MCNC: 12 MG/DL (ref 8–20)
CALCIUM BLD-MCNC: 8.7 MG/DL (ref 8.3–10.3)
CHLORIDE: 105 MMOL/L (ref 101–111)
CHOLEST SMN-MCNC: 134 MG/DL (ref ?–200)
CO2: 26 MMOL/L (ref 22–32)
COMPLEXED PSA SERPL-MCNC: 0.39 NG/ML (ref 0.01–4)
CREAT BLD-MCNC: 0.86 MG/DL (ref 0.7–1.3)
EOSINOPHIL # BLD AUTO: 0.22 X10(3) UL (ref 0–0.3)
EOSINOPHIL NFR BLD AUTO: 4.4 %
ERYTHROCYTE [DISTWIDTH] IN BLOOD BY AUTOMATED COUNT: 12.3 % (ref 11.5–16)
GLUCOSE BLD-MCNC: 89 MG/DL (ref 70–99)
HCT VFR BLD AUTO: 45.7 % (ref 37–53)
HDLC SERPL-MCNC: 42 MG/DL (ref 45–?)
HDLC SERPL: 3.19 {RATIO} (ref ?–4.97)
HGB BLD-MCNC: 15.4 G/DL (ref 13–17)
IMMATURE GRANULOCYTE COUNT: 0.02 X10(3) UL (ref 0–1)
IMMATURE GRANULOCYTE RATIO %: 0.4 %
LDLC SERPL CALC-MCNC: 69 MG/DL (ref ?–130)
LYMPHOCYTES # BLD AUTO: 1.01 X10(3) UL (ref 0.9–4)
LYMPHOCYTES NFR BLD AUTO: 20.4 %
M PROTEIN MFR SERPL ELPH: 7.1 G/DL (ref 6.1–8.3)
MCH RBC QN AUTO: 31 PG (ref 27–33.2)
MCHC RBC AUTO-ENTMCNC: 33.7 G/DL (ref 31–37)
MCV RBC AUTO: 92 FL (ref 80–99)
MONOCYTES # BLD AUTO: 0.35 X10(3) UL (ref 0.1–1)
MONOCYTES NFR BLD AUTO: 7.1 %
NEUTROPHIL ABS PRELIM: 3.32 X10 (3) UL (ref 1.3–6.7)
NEUTROPHILS # BLD AUTO: 3.32 X10(3) UL (ref 1.3–6.7)
NEUTROPHILS NFR BLD AUTO: 66.9 %
NONHDLC SERPL-MCNC: 92 MG/DL (ref ?–130)
PLATELET # BLD AUTO: 168 10(3)UL (ref 150–450)
POTASSIUM SERPL-SCNC: 3.7 MMOL/L (ref 3.6–5.1)
RBC # BLD AUTO: 4.97 X10(6)UL (ref 4.3–5.7)
RED CELL DISTRIBUTION WIDTH-SD: 41.5 FL (ref 35.1–46.3)
SODIUM SERPL-SCNC: 140 MMOL/L (ref 136–144)
TRIGL SERPL-MCNC: 116 MG/DL (ref ?–150)
TSI SER-ACNC: 1.19 MIU/ML (ref 0.35–5.5)
VLDLC SERPL CALC-MCNC: 23 MG/DL (ref 5–40)
WBC # BLD AUTO: 5 X10(3) UL (ref 4–13)

## 2018-03-12 PROCEDURE — 84443 ASSAY THYROID STIM HORMONE: CPT

## 2018-03-12 PROCEDURE — 80050 GENERAL HEALTH PANEL: CPT

## 2018-03-12 PROCEDURE — 36415 COLL VENOUS BLD VENIPUNCTURE: CPT

## 2018-03-12 PROCEDURE — 82306 VITAMIN D 25 HYDROXY: CPT

## 2018-03-12 PROCEDURE — 80061 LIPID PANEL: CPT

## 2018-03-12 PROCEDURE — 99396 PREV VISIT EST AGE 40-64: CPT | Performed by: FAMILY MEDICINE

## 2018-03-12 PROCEDURE — 80053 COMPREHEN METABOLIC PANEL: CPT

## 2018-03-12 NOTE — PROGRESS NOTES
Chief Complaint:   Patient presents with:  Physical    HPI:   This is a 61year old male presenting for physical, no new complaints. HPI: see above.      Past Medical History:   Diagnosis Date   • Obstructive sleep apnea (adult) (pediatric) Edwrad HST hematuria, flank pain and difficulty urinating. Musculoskeletal: Negative for joint pain, gait problem, neck pain and neck stiffness. Skin: Negative for color change, pallor, rash and wound.    Allergic/Immunologic: Negative for environmental allergies, of motion. He exhibits no tenderness or effusion. Lymphadenopathy:     He has no cervical adenopathy. Neurological: He is alert and oriented to person, place, and time. No cranial nerve deficit or motor deficit.  Gait normal.   Skin: Skin is warm and dr

## 2018-03-16 ENCOUNTER — TELEPHONE (OUTPATIENT)
Dept: FAMILY MEDICINE CLINIC | Facility: CLINIC | Age: 58
End: 2018-03-16

## 2018-03-16 DIAGNOSIS — E55.9 VITAMIN D DEFICIENCY: Primary | ICD-10-CM

## 2018-03-16 RX ORDER — ERGOCALCIFEROL 1.25 MG/1
50000 CAPSULE ORAL WEEKLY
Qty: 4 CAPSULE | Refills: 3 | Status: SHIPPED | OUTPATIENT
Start: 2018-03-16 | End: 2018-07-06

## 2018-03-16 NOTE — TELEPHONE ENCOUNTER
----- Message from Thierry Cullen MD sent at 3/15/2018  9:46 AM CDT -----  Low vitamin D, needs vitamin D 50,000 units q weekly #4 with 3 refills then, 5000 units q daily maintenance   Repeat Vitamin D in 6-12 months.

## 2018-08-03 RX ORDER — NAPROXEN 500 MG/1
TABLET ORAL
Qty: 60 TABLET | Refills: 3 | Status: SHIPPED | OUTPATIENT
Start: 2018-08-03

## 2019-11-11 NOTE — TELEPHONE ENCOUNTER
Medication(s) to Refill:   Requested Prescriptions     Pending Prescriptions Disp Refills   • NAPROXEN 500 MG Oral Tab [Pharmacy Med Name: NAPROXEN 500MG      TAB] 60 tablet 3     Sig: TAKE 1 TABLET BY MOUTH TWICE DAILY         Reason for Medication Refill

## 2019-11-13 RX ORDER — NAPROXEN 500 MG/1
TABLET ORAL
Qty: 60 TABLET | Refills: 3 | OUTPATIENT
Start: 2019-11-13

## 2020-04-29 ENCOUNTER — TELEPHONE (OUTPATIENT)
Dept: FAMILY MEDICINE CLINIC | Facility: CLINIC | Age: 60
End: 2020-04-29

## 2021-11-27 ENCOUNTER — OFFICE VISIT (OUTPATIENT)
Dept: FAMILY MEDICINE CLINIC | Facility: CLINIC | Age: 61
End: 2021-11-27

## 2021-11-27 VITALS
RESPIRATION RATE: 12 BRPM | DIASTOLIC BLOOD PRESSURE: 78 MMHG | HEIGHT: 71 IN | HEART RATE: 60 BPM | BODY MASS INDEX: 34.72 KG/M2 | TEMPERATURE: 98 F | OXYGEN SATURATION: 96 % | SYSTOLIC BLOOD PRESSURE: 110 MMHG | WEIGHT: 248 LBS

## 2021-11-27 DIAGNOSIS — G47.33 OSA (OBSTRUCTIVE SLEEP APNEA): ICD-10-CM

## 2021-11-27 DIAGNOSIS — G56.03 BILATERAL CARPAL TUNNEL SYNDROME: ICD-10-CM

## 2021-11-27 DIAGNOSIS — Z00.00 ROUTINE HEALTH MAINTENANCE: Primary | ICD-10-CM

## 2021-11-27 DIAGNOSIS — Z82.0 FAMILY HISTORY OF ALZHEIMER'S DISEASE: ICD-10-CM

## 2021-11-27 DIAGNOSIS — Z12.5 PROSTATE CANCER SCREENING: ICD-10-CM

## 2021-11-27 DIAGNOSIS — E55.9 VITAMIN D DEFICIENCY: ICD-10-CM

## 2021-11-27 DIAGNOSIS — D69.6 THROMBOCYTOPENIA, UNSPECIFIED (HCC): ICD-10-CM

## 2021-11-27 PROCEDURE — 3074F SYST BP LT 130 MM HG: CPT | Performed by: FAMILY MEDICINE

## 2021-11-27 PROCEDURE — 99386 PREV VISIT NEW AGE 40-64: CPT | Performed by: FAMILY MEDICINE

## 2021-11-27 PROCEDURE — 3078F DIAST BP <80 MM HG: CPT | Performed by: FAMILY MEDICINE

## 2021-11-27 PROCEDURE — 3008F BODY MASS INDEX DOCD: CPT | Performed by: FAMILY MEDICINE

## 2021-11-27 NOTE — PROGRESS NOTES
Adamaris Davidson is a 57year old male.   Patient presents with:  Physical      HPI:   Px    NAPROXEN 500 MG Oral Tab, TAKE ONE TABLET BY MOUTH TWICE DAILY (Patient not taking: Reported on 11/27/2021), Disp: 60 tablet, Rfl: 3    No current facility-admini kg (166 lb 0.1 oz)  Adjusted ideal body weight: 90.2 kg (198 lb 12.9 oz)    GENERAL: well developed, well nourished,in no apparent distress  SKIN: no rashes,no suspicious lesions  HEENT: atraumatic, normocephalic, R TM normal, L TM normal, Pharynx normal

## 2021-12-27 ENCOUNTER — PATIENT MESSAGE (OUTPATIENT)
Dept: FAMILY MEDICINE CLINIC | Facility: CLINIC | Age: 61
End: 2021-12-27

## 2021-12-27 ENCOUNTER — TELEPHONE (OUTPATIENT)
Dept: FAMILY MEDICINE CLINIC | Facility: CLINIC | Age: 61
End: 2021-12-27

## 2021-12-28 NOTE — TELEPHONE ENCOUNTER
LEFT MESSAGE TO CALL OFFICE  No lab results received; please call lab and request they fax results to [phone].

## 2021-12-29 ENCOUNTER — PATIENT MESSAGE (OUTPATIENT)
Dept: FAMILY MEDICINE CLINIC | Facility: CLINIC | Age: 61
End: 2021-12-29

## 2021-12-29 NOTE — TELEPHONE ENCOUNTER
From: Cathy Mail  To: Ayse León DO  Sent: 12/27/2021 3:40 PM CST  Subject: lab results    [facility] stated they sent you my lab results,  do you have them? could you please enter into my chart the results.   appreciate that  chriss dee DISPLAY PLAN FREE TEXT DISPLAY PLAN FREE TEXT DISPLAY PLAN FREE TEXT DISPLAY PLAN FREE TEXT DISPLAY PLAN FREE TEXT

## 2021-12-29 NOTE — TELEPHONE ENCOUNTER
From: Kobe Starkey  Sent: 12/29/2021 3:23 PM CST  To: [facility]  Subject: lab results    Maritza Magana for your reply, however under test results, i still do not see anything,  last results are from 2018 with Dr Rodrigo Becerra  when stacie

## 2022-01-11 ENCOUNTER — TELEPHONE (OUTPATIENT)
Dept: FAMILY MEDICINE CLINIC | Facility: CLINIC | Age: 62
End: 2022-01-11

## 2022-01-11 NOTE — TELEPHONE ENCOUNTER
Patient [facility]. He is applying for a life insurance policy. GVF-[phone]. Patient gives me verbal permission to do this.     Faxed as requested

## 2025-01-20 ENCOUNTER — OFFICE VISIT (OUTPATIENT)
Dept: FAMILY MEDICINE CLINIC | Facility: CLINIC | Age: 65
End: 2025-01-20
Payer: MEDICARE

## 2025-01-20 VITALS
HEART RATE: 66 BPM | BODY MASS INDEX: 32.2 KG/M2 | WEIGHT: 230 LBS | RESPIRATION RATE: 18 BRPM | SYSTOLIC BLOOD PRESSURE: 118 MMHG | DIASTOLIC BLOOD PRESSURE: 70 MMHG | TEMPERATURE: 98 F | OXYGEN SATURATION: 97 % | HEIGHT: 71 IN

## 2025-01-20 DIAGNOSIS — J01.90 ACUTE NON-RECURRENT SINUSITIS, UNSPECIFIED LOCATION: Primary | ICD-10-CM

## 2025-01-20 DIAGNOSIS — R05.1 ACUTE COUGH: ICD-10-CM

## 2025-01-20 PROCEDURE — 99202 OFFICE O/P NEW SF 15 MIN: CPT | Performed by: PHYSICIAN ASSISTANT

## 2025-01-20 RX ORDER — BENZONATATE 200 MG/1
200 CAPSULE ORAL 3 TIMES DAILY PRN
Qty: 30 CAPSULE | Refills: 0 | Status: SHIPPED | OUTPATIENT
Start: 2025-01-20

## 2025-01-20 RX ORDER — LACOSAMIDE 200 MG/1
TABLET ORAL
COMMUNITY
Start: 2024-12-16

## 2025-01-20 RX ORDER — CLOBAZAM 10 MG/1
TABLET ORAL
COMMUNITY
Start: 2024-12-01

## 2025-01-20 RX ORDER — ALBUTEROL SULFATE 90 UG/1
2 INHALANT RESPIRATORY (INHALATION) EVERY 6 HOURS PRN
Qty: 1 EACH | Refills: 0 | Status: SHIPPED | OUTPATIENT
Start: 2025-01-20

## 2025-01-20 NOTE — PROGRESS NOTES
CHIEF COMPLAINT:     Chief Complaint   Patient presents with    Cough     Started last Tuesday   No fevers   Loss voice        HPI:   Ian Mchugh is a 64 year old male who presents with 1 week for cough, congestion, facial pressure, losing voice.  Now bringing up some brown mucus.  Notes occasional light wheeze.    Told to avoid cipro, benedryl, sudafed from his neurologist.  History of craniotomy s/p avm rupture.  No CVA.        Associated symptoms:    Fever/Chills  Yes one night felt sweaty several days ago but no longer sweaty or chilled.   Sore throat  Yes  Cough  Yes   Congestion Yes  Bodyache  No  Headache  No  Chest pain No  SOB/Dyspnea No  Loss of taste No  Loss of smell No  Diarrhea No  Vomiting No      Current Outpatient Medications   Medication Sig Dispense Refill    cloBAZam 10 MG Oral Tab       lacosamide 200 MG Oral Tab       benzonatate 200 MG Oral Cap Take 1 capsule (200 mg total) by mouth 3 (three) times daily as needed for cough. 30 capsule 0    albuterol 108 (90 Base) MCG/ACT Inhalation Aero Soln Inhale 2 puffs into the lungs every 6 (six) hours as needed for Wheezing. 1 each 0    amoxicillin clavulanate 875-125 MG Oral Tab Take 1 tablet by mouth 2 (two) times daily for 10 days. 20 tablet 0      Past Medical History:    Obstructive sleep apnea (adult) (pediatric)    AHI [address] 73%    PONV (postoperative nausea and vomiting)    Sleep apnea      Social History:  Social History     Socioeconomic History    Marital status:    Tobacco Use    Smoking status: Never    Smokeless tobacco: Never   Substance and Sexual Activity    Alcohol use: No     Comment: Quit    Drug use: No   Other Topics Concern    Caffeine Concern Yes     Comment: less than 1-2 cups/cans daily    Exercise Yes     Comment: 4x a week    Seat Belt Yes        Review of Systems:    Positive for stated complaint: cough, congestion.   Pertinent positives and negatives noted in the the HPI.    EXAM:   /70    Pulse 66   Temp 97.8 °F (36.6 °C)   Resp 18   Ht 5' 11\" (1.803 m)   Wt 230 lb (104.3 kg)   SpO2 97%   BMI 32.08 kg/m²   GENERAL: well developed, well nourished,in no apparent distress.  Laryngitic hoarse quality to voice.   SKIN: no rashes,no suspicious lesions  HEAD: atraumatic, normocephalic  EYES: conjunctiva clear, sclera white,  PERRLA  EARS: TM's non erythematous  NOSE: nares patent, mucosa mild congestion  THROAT: Posterior pharynx is mildy erythematous, no  exudate  NECK: supple, non-tender  LUNGS: clear to auscultation bilaterally without rale, ronchi, wheeze.  CARDIO: S1/S2 without murmur  EXTREMITIES: no cyanosis, clubbing or edema  LYMPH:  no lymphadenopathy.      No results found for this or any previous visit (from the past 24 hours).      ASSESSMENT AND PLAN:   Ian Mchugh is a 64 year old male who presents with Cough (Started last Tuesday /No fevers /Loss voice ). Symptoms are consistent with:    Discussed symptoms likely viral at this point.  If progressive worsening or not improving over 72 hours given augmentin to begin.    ASSESSMENT:  Encounter Diagnoses   Name Primary?    Acute non-recurrent sinusitis, unspecified location Yes    Acute cough        PLAN:  viral testing declined.    Symptomatic care:   1. Rest. Drink plenty of fluids.  2. Tylenol or ibuprofen for discomfort or fever.       nasal steroid sprays  (fluticasone) may be         helpful for congestion.  3 Chloraseptic spray/throat lozenges for sore throat   4 augmentin as written  5 tessalon as written  6 albuterol as written       Go to the ED for evaluation with progressive symptoms of difficulty swallowing, breathing, shortness of breath, chest pain, extreme weakness, or confusion.         Meds & Refills for this Visit:  Requested Prescriptions     Signed Prescriptions Disp Refills    benzonatate 200 MG Oral Cap 30 capsule 0     Sig: Take 1 capsule (200 mg total) by mouth 3 (three) times daily as needed for cough.     albuterol 108 (90 Base) MCG/ACT Inhalation Aero Soln 1 each 0     Sig: Inhale 2 puffs into the lungs every 6 (six) hours as needed for Wheezing.    amoxicillin clavulanate 875-125 MG Oral Tab 20 tablet 0     Sig: Take 1 tablet by mouth 2 (two) times daily for 10 days.       Risks, benefits, side effects of medication addressed and explained.    There are no Patient Instructions on file for this visit.    The patient indicates understanding of these issues and agrees to the plan.    The patient is asked to follow up PCP

## 2025-07-30 ENCOUNTER — TELEPHONE (OUTPATIENT)
Dept: FAMILY MEDICINE CLINIC | Facility: CLINIC | Age: 65
End: 2025-07-30

## (undated) DEVICE — KENDALL SCD EXPRESS SLEEVES, KNEE LENGTH, MEDIUM: Brand: KENDALL SCD

## (undated) DEVICE — INSTRUMENT FEE

## (undated) DEVICE — 3M™ MICROFOAM™ TAPE 1528-4: Brand: 3M™ MICROFOAM™

## (undated) DEVICE — ORTHO CDS-LF: Brand: MEDLINE INDUSTRIES, INC.

## (undated) DEVICE — GLOBAL APG+ QUICK CONNECT PERIPHERAL DRILL BIT: Brand: GLOBAL APG+

## (undated) DEVICE — PROXIMATE SKIN STAPLERS (35 WIDE) CONTAINS 35 STAINLESS STEEL STAPLES (FIXED HEAD): Brand: PROXIMATE

## (undated) DEVICE — SUTURE VICRYL 0 CP-1

## (undated) DEVICE — DRAPE,U/SHT,SPLIT,FILM,60X84,STERILE: Brand: MEDLINE

## (undated) DEVICE — GOWN,SIRUS,FABRIC-REINFORCED,X-LARGE: Brand: MEDLINE

## (undated) DEVICE — GLOBAL APG+ QUICK CONNECT CENTRAL DRILL BIT 48,52,56: Brand: GLOBAL APG+

## (undated) DEVICE — #15 STERILE STAINLESS BLADE: Brand: STERILE STAINLESS BLADES

## (undated) DEVICE — SUTURE VICRYL 2-0 CP-1

## (undated) DEVICE — SUTURE FIBERWIRE 2 AR-7202

## (undated) DEVICE — GLOVE SURG TRIUMPH SZ 71/2

## (undated) DEVICE — Device: Brand: STABLECUT®

## (undated) DEVICE — GAUZE SPONGES,12 PLY: Brand: CURITY

## (undated) DEVICE — GLOVE SURG TRIUMPH SZ 8

## (undated) DEVICE — INTENDED TO AID IN THE PASSING OF SUTURES THROUGH BONE AND SOFT TISSUE DURING ORTHOPEDIC SURGERY: Brand: HOFFEE SUTURE RETRIEVER

## (undated) DEVICE — SOL  .9 1000ML BTL

## (undated) DEVICE — WRAP COOLING SHLDR W/ICE PILLO

## (undated) DEVICE — NEEDLE ANCHOR 1834-5-D

## (undated) NOTE — LETTER
10/16/18        Francheska Campos [phone]      Dear Sendy Root records indicate that you have outstanding lab work and or testing that was ordered for you and has not yet been completed:  Orders Placed This Encounte

## (undated) NOTE — LETTER
12/27/21        Linda Spearstommieleila [address] [phone]      Dear Tegan Vazquez records indicate that you have outstanding lab work and or testing that was ordered for you and has not yet been completed:  Orders Placed This Encounte

## (undated) NOTE — Clinical Note
FYI, TCM call made, see notes. NCM attempted to schedule TCM HFU by 12/8/17 no later than 12/15/17, however, patient states he will call himself to schedule. Message sent to MD's office.

## (undated) NOTE — MR AVS SNAPSHOT
[address] [place] [facility] [address] [phone]               Thank you for choosing us for your health care visit with Jase Nelson MD.  We are glad to serve you and happy to provide you with this summary of your ORTHOPEDIC - INTERNAL [[id number] CUSTOM]  Order #:  [id number]         **REFERRAL REQUEST**    Your physician has referred you to a specialist.  Your physician or the clinic staff will provide you with the phone number you should call to schedule your appoi Make half your plate fruits and vegetables Highly refined, white starches including white bread, rice and pasta   Eat plenty of protein, keep the fat content low Sugars:  sodas and sports drinks, candies and desserts   Eat plenty of low-fat dairy products

## (undated) NOTE — IP AVS SNAPSHOT
Patient Demographics     Address  [address] [place] [address] Phone  [phone] [facility]) *Preferred*  [phone] (Work)  [phone] [facility]) E-mail Address  [email or web address]      Emergency Contact(s)     Name Relation Home Work ? DO NOT rotate your arm out to the side. ? you may use a pillow under the elbow while sitting in a chair for comfort and support, no weight or leaning on surgical arm    No smoking  ? Avoid smoking.  Known to cause breathing problems and can decrease the ? There could be a small amount of redness around the staples or incision; this is normal.  ? Watch for increased redness, warmth, any odor, increased drainage or opening of the incision.   ? Call physic Notify your surgeon if you notice any of the following signs  ? Separation of incision line. ? Increased redness, swelling, or warmth of skin around incision. ? Increased or foul smelling drainage from incision  ? Red streaks on skin near incision. ?  Te Notes to patient:  Do not resume until cleared by Dr Cami Crabtree      Take 1 tablet (500 mg total) by mouth 2 (two) times daily. Julia Thakkar MD         Vitamin D3 01724 units Caps      Take by mouth daily.                 Where to Get Your Medications      Ple Resp  16 Filed at 12/01/2017 1200   Temp  98.2 °F (36.8 °C) Filed at 12/01/2017 1200   SpO2  95 % Filed at 12/01/2017 1200      Patient's Most Recent Weight    Flowsheet Row Most Recent Value   Patient Weight  114.8 kg (252 lb 15.7 oz)      CPAP Settings ( Click to print [employer] Sheet for scanning   Office Visit     11/13/2017  Ezra Ordoñez MD   Family Medicine   Pre-op evaluation +3 more   Dx   Pre-Op Exam    Reason for Visit    Reason for Visit 01/09/2012: COLONOSCOPY  4/13/2012--Dr. Nelson April: HAND/FINGER SURGERY UNLISTED Left      Comment: trigger thumb release  No date: OTHER SURGICAL HISTORY      Comment: right shoulder replacement5/2008  No Known Allergies     Current Outpatient Prescriptions: Neurologic: No focal neurological deficits. Musculoskeletal: Full range of motion of all extremities. No swelling noted. Integument: No lesions. No erythema.   Psychiatric: Appropriate mood and affect.     LABORATORY DATA, Imaging, EKG:   Labs and EKG-st :  Gavin Young MD (Physician)     Consult Orders:    1.  Consult to Hospitalist Once [[id number]] ordered by King Ryder MD at [address] [place] [address] Patient Status:  Inpatient     Multiple Vitamins-Minerals (MULTIVITAMIN ADULT OR) Take by mouth. Disp:  Rfl:    Cholecalciferol (VITAMIN D3) 85462 units Oral Cap Take by mouth daily. Disp:  Rfl:        Review of Systems:   A comprehensive 14 point review of systems was completed.     Per Plan of care discussed with pt and nurse    Matt Mendoza MD  11/30/2017[MP.1]      Electronically signed by Elaina Ortega MD on 11/30/2017  4:23 PM   Attribution Key    MP.1 - Elaina Ortega MD on 11/30/2017  3:05 PM                     D/C Summary     No n Railing: Yes (both sides)    Lives With: Spouse  Drives: Yes  Patient Owned Equipment: None  Patient Regularly Uses: Reading glasses    Prior Level of [place]: Independent w/ adl's, walking, drives and works full time in auto parts sales.     [facility] PT Approx Degree of Impairment Score: 0%   Standardized Score (AM-PAC Scale): 61.14   CMS Modifier (G-Code): CH    FUNCTIONAL ABILITY STATUS  Gait Assessment   Gait Assistance: Modified independent  Distance (ft): 300  Assistive Device: None  Pattern: With Goal #1 Patient is able to demonstrate supine - sit EOB @ level: modified independent     Goal #2 Patient is able to demonstrate transfers EOB to/from [facility] at assistance level: modified independent     Goal #3 Patient is able to ambulate 300 feet with hermann 8/5/16: CARPAL TUNNEL RELEASE Right      Comment: Dr. Ismael White  12/20/16: CARPAL TUNNEL RELEASE Left      Comment: Dr. Ismael White  01/09/2012: COLONOSCOPY  4/13/2012--Dr. Ismael White: HAND/FINGER SURGERY UNLISTED Left      Comment: trigger thumb release  No date: OTHER AM-PAC Score:  Score: 18  Approx Degree of Impairment: 46.65%  Standardized Score (AM-PAC Scale): 38.66  CMS Modifier (G-Code): CK    FUNCTIONAL TRANSFER ASSESSMENT  Supine to Sit : Supervision  Sit to Stand: Supervision    Skilled Therapy Provided: [facility] Specific performance deficits impacting engagement in ADL/IADL  LOW  1 - 3 performance deficits    Client Assessment/Performance Deficits  LOW - No comorbidities nor modifications of tasks    Clinical Decision Making  LOW - Analysis of occupational profile